# Patient Record
Sex: MALE | Race: WHITE | NOT HISPANIC OR LATINO | Employment: FULL TIME | URBAN - METROPOLITAN AREA
[De-identification: names, ages, dates, MRNs, and addresses within clinical notes are randomized per-mention and may not be internally consistent; named-entity substitution may affect disease eponyms.]

---

## 2018-06-08 ENCOUNTER — RECORDS - HEALTHEAST (OUTPATIENT)
Dept: LAB | Facility: CLINIC | Age: 58
End: 2018-06-08

## 2018-06-08 LAB
ANION GAP SERPL CALCULATED.3IONS-SCNC: 10 MMOL/L (ref 5–18)
BUN SERPL-MCNC: 15 MG/DL (ref 8–22)
CALCIUM SERPL-MCNC: 9.6 MG/DL (ref 8.5–10.5)
CHLORIDE BLD-SCNC: 103 MMOL/L (ref 98–107)
CO2 SERPL-SCNC: 25 MMOL/L (ref 22–31)
CREAT SERPL-MCNC: 0.99 MG/DL (ref 0.7–1.3)
GFR SERPL CREATININE-BSD FRML MDRD: >60 ML/MIN/1.73M2
GLUCOSE BLD-MCNC: 84 MG/DL (ref 70–125)
POTASSIUM BLD-SCNC: 4.1 MMOL/L (ref 3.5–5)
SODIUM SERPL-SCNC: 138 MMOL/L (ref 136–145)

## 2018-06-13 ENCOUNTER — RECORDS - HEALTHEAST (OUTPATIENT)
Dept: LAB | Facility: CLINIC | Age: 58
End: 2018-06-13

## 2018-06-13 LAB — IRON SERPL-MCNC: 70 UG/DL (ref 42–175)

## 2019-10-30 ENCOUNTER — RECORDS - HEALTHEAST (OUTPATIENT)
Dept: LAB | Facility: CLINIC | Age: 59
End: 2019-10-30

## 2019-10-30 LAB
ANION GAP SERPL CALCULATED.3IONS-SCNC: 7 MMOL/L (ref 5–18)
BUN SERPL-MCNC: 16 MG/DL (ref 8–22)
CALCIUM SERPL-MCNC: 9.5 MG/DL (ref 8.5–10.5)
CHLORIDE BLD-SCNC: 102 MMOL/L (ref 98–107)
CO2 SERPL-SCNC: 29 MMOL/L (ref 22–31)
CREAT SERPL-MCNC: 0.99 MG/DL (ref 0.7–1.3)
GFR SERPL CREATININE-BSD FRML MDRD: >60 ML/MIN/1.73M2
GLUCOSE BLD-MCNC: 95 MG/DL (ref 70–125)
POTASSIUM BLD-SCNC: 4.6 MMOL/L (ref 3.5–5)
SODIUM SERPL-SCNC: 138 MMOL/L (ref 136–145)

## 2020-10-23 ENCOUNTER — RECORDS - HEALTHEAST (OUTPATIENT)
Dept: LAB | Facility: CLINIC | Age: 60
End: 2020-10-23

## 2020-10-23 LAB
ANION GAP SERPL CALCULATED.3IONS-SCNC: 9 MMOL/L (ref 5–18)
BUN SERPL-MCNC: 17 MG/DL (ref 8–22)
CALCIUM SERPL-MCNC: 9.7 MG/DL (ref 8.5–10.5)
CHLORIDE BLD-SCNC: 103 MMOL/L (ref 98–107)
CHOLEST SERPL-MCNC: 193 MG/DL
CO2 SERPL-SCNC: 28 MMOL/L (ref 22–31)
CREAT SERPL-MCNC: 1.09 MG/DL (ref 0.7–1.3)
FASTING STATUS PATIENT QL REPORTED: NORMAL
GFR SERPL CREATININE-BSD FRML MDRD: >60 ML/MIN/1.73M2
GLUCOSE BLD-MCNC: 98 MG/DL (ref 70–125)
HDLC SERPL-MCNC: 46 MG/DL
LDLC SERPL CALC-MCNC: 128 MG/DL
POTASSIUM BLD-SCNC: 5.1 MMOL/L (ref 3.5–5)
SODIUM SERPL-SCNC: 140 MMOL/L (ref 136–145)
TRIGL SERPL-MCNC: 94 MG/DL

## 2021-06-17 ENCOUNTER — RECORDS - HEALTHEAST (OUTPATIENT)
Dept: LAB | Facility: CLINIC | Age: 61
End: 2021-06-17

## 2021-06-17 ENCOUNTER — TRANSFERRED RECORDS (OUTPATIENT)
Dept: HEALTH INFORMATION MANAGEMENT | Facility: CLINIC | Age: 61
End: 2021-06-17

## 2021-06-17 LAB
TSH SERPL DL<=0.005 MIU/L-ACNC: 1.53 UIU/ML (ref 0.3–5)
VIT B12 SERPL-MCNC: 668 PG/ML (ref 213–816)

## 2021-06-18 LAB — B BURGDOR IGG+IGM SER QL: 0.3 INDEX VALUE

## 2021-07-19 ENCOUNTER — TRANSCRIBE ORDERS (OUTPATIENT)
Dept: PHYSICAL MEDICINE AND REHAB | Facility: CLINIC | Age: 61
End: 2021-07-19

## 2021-07-19 DIAGNOSIS — R20.2 PARESTHESIA: Primary | ICD-10-CM

## 2021-07-27 NOTE — PROGRESS NOTES
ASSESSMENT: Jw Anton is a 61 year old male with past medical history significant for hypertension who presents today for new patient evaluation of a 6-week history of left lower extremity numbness and weakness and acute on chronic low back pain.  Numbness and weakness began 6 weeks ago without incident.  Chronic back pain worsened within the past few days.  My review of an MRI lumbar spine shows significant bilateral lateral recess stenosis at L4-5 related to a shallow disc protrusion where he could be trapping the L5 nerve roots.  Patient had an EMG left lower extremity 2 weeks after symptoms began which showed findings most consistent with subacute to chronic left L5 radiculopathy with no findings of peroneal nerve palsy at that time.  Patient reports that numbness and weakness has improved by about 20 to 30% since it began.  -On exam patient demonstrated weakness in the left ankle dorsiflexors and left EHL graded 4 out of 5.  He reported a sensory deficit left toes 2, 3, 4, and plantar/lateral aspect of left foot.    PLAN:  A shared decision making model was used.  The patient's values and choices were respected.  The following represents what was discussed and decided upon by the physician assistant and the patient.      1.  DIAGNOSTIC TESTS: I reviewed the MRI lumbar spine.  -I also reviewed the EMG left lower extremity from June 23.  -I ordered a repeat left EMG which will be more diagnostic now that he has symptoms greater than 1 month.    2.  PHYSICAL THERAPY:  Discussed the importance of core strengthening, ROM, stretching exercises with the patient and how each of these entities is important in decreasing pain.  Explained to the patient that the purpose of physical therapy is to teach the patient a home exercise program.  These exercises need to be performed every day in order to decrease pain and prevent future occurrences of pain.  I entered a referral to physical therapy.    3.  MEDICATIONS:    -Gabapentin 300 mg was prescribed.  He has been in the dosage titration chart.  He may increase his dose up to 300 mg 3 times daily.  -Patient can take ibuprofen as needed.    4.  INTERVENTIONS:    -I offered the patient left L5-S1 transforaminal epidural steroid injection targeting the left L5 nerve root.  Patient indicated he would like to proceed and an order was placed.  Second Covid vaccine was Julissa 3, 2021.  In my opinion the injection should not be delayed for a trial of conservative treatment.  He has significant weakness with EMG documented radiculopathy.    5.  PATIENT EDUCATION: Patient is in agreement the above plan.  All questions were answered.    6.  REFERRALS: I also entered a referral to neurosurgery in the case that symptoms fail to improve.    7.  FOLLOW-UP:   I would like to see the patient 1 week after his left L5-S1 transforaminal epidural steroid injection.  We will call the patient with the results of his EMG.  Will await recommendations from neurosurgery.  If he has questions or concerns, he should not hesitate to call.      SUBJECTIVE:  Jw Anton  Is a 61 year old male who presents today in consultation at the request of Dr. Bedoya for new patient evaluation left lower extremity numbness and weakness and low back pain.  Patient reports that symptoms began about 6 weeks ago.  He states that he went to a grad party and was sitting around a bonfire.  The next day he woke up with numb toes and weakness in the left ankle.  He does not recall compressing his knee against anything.  He was not sitting in an unusual position.  He did not lift anything heavy.  He did not have to drive far to get to the [place] [employer].  Patient states that the numbness and weakness has improved by 20 to 30% since it began but he is still quite limited in walking because of this weakness.  Over the past several days he has also had worsening of his chronic low back pain.  He states that he has had low back pain  for many years which he attributed to years of heavy lifting at work.  He has also experienced some pain extending into the right buttock and hip which he attributes to having to compensate for the left leg weakness with walking.    Patient complains of bilateral low back pain.  Pain spans across low back in a Broadbent distribution at the belt line.  He has some pain that radiates into the right buttock and hip.  He denies any significant pain down the left leg but he does feel a sensation of tightness in the left calf muscle.  He has numbness and tingling affecting left toes 2, 3, 4, the ball of the left foot and the heel of the left foot.  He states that his left ankle is still weak.  He states it feels like a club.  He denies any aggravating factors for the symptoms they are constant.  He denies any alleviating factors for the symptoms.  He tried ice.  He denies loss of bowel or bladder control.  Denies any recent fevers, chills, sweats.  Denies any numbness or weakness in the upper extremities.  Denies blurry or double vision.    Patient has not had any treatments for this.  He has not had physical therapy.  He does not go to a chiropractor.  He has not had any spine surgeries or spine injections.  Patient is using ibuprofen 800 mg 3 times daily.    Current Outpatient Medications   Medication Sig Dispense Refill     amLODIPine (NORVASC) 10 MG tablet Take 10 mg by mouth daily       aspirin 81 MG EC tablet Take 81 mg by mouth daily       fish oil-omega-3 fatty acids 1000 MG capsule Take 2 g by mouth daily       gabapentin (NEURONTIN) 300 MG capsule Take 1 capsule (300 mg) by mouth At Bedtime for 3 days, THEN 1 capsule (300 mg) 2 times daily for 3 days, THEN 1 capsule (300 mg) 3 times daily. 90 capsule 0     losartan (COZAAR) 25 MG tablet Take 25 mg by mouth daily       Multiple Vitamin (MULTIVITAMIN ADULT PO)            Allergies: None    Past medical history: Hypertension    Surgical history: None    Family  history: Sibling had Crohn's disease    Social history: Patient is .  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  He drinks several alcoholic beverages per week.  Denies tobacco use.  Denies illicit drug use.      ROS: Positive for joint pain, muscle pain, muscle fatigue, sciatica.  Specifically negative for bowel/bladder dysfunction, fevers,chills, appetite changes, unexplained weight loss.   Otherwise 13 systems reviewed are negative.  Please see the patient's intake questionnaire from today for details.      OBJECTIVE:  PHYSICAL EXAMINATION:    CONSTITUTIONAL:  Vital signs as above.  No acute distress.  The patient is well nourished and well groomed.  PSYCHIATRIC:  The patient is awake, alert, oriented to person, place, time and answering questions appropriately with clear speech.    HEENT: Normocephalic, atraumatic.  Sclera clear.  Neck is supple.  SKIN:  Skin over the face, bilateral lower extremities, and posterior torso is clean, dry, intact without rashes.    GAIT: Patient ambulates with a slight steppage gait on the left.  STANDING EXAMINATION: No significant tenderness to palpation bilateral lower lumbar paraspinous muscles.  Lumbar flexion mildly restricted.  Lumbar extension mildly restricted.  MUSCLE STRENGTH:  The patient has 4/5 strength left ankle dorsiflexors and left EHL.  5/5 strength for the bilateral hip flexors, knee flexors/extensors, right ankle dorsiflexors, bilateral ankle plantar flexors, right great toe extensors, bilateral ankle evertors/invertors.  NEUROLOGICAL:  2/4 patellar, and achilles reflexes bilaterally.  Negative Babinski's bilaterally.  No ankle clonus bilaterally.  Diminished sensation left toes 2, 3, 4, plantar aspect left foot, lateral aspect left foot.  VASCULAR:  2/4 dorsalis pedis pulses bilaterally.  Bilateral lower extremities are warm.  There is no pitting edema of the bilateral lower extremities.  ABDOMINAL:  Soft, non-distended, non-tender throughout  all quadrants.  No pulsatile mass palpated in the left lower quadrant.  LYMPH NODES:  No palpable or tender inguinal lymph nodes.  MUSCULOSKELETAL: Straight leg raise negative bilaterally.    RESULTS: I reviewed the MRI lumbar spine from [facility] dated July 2, 2021.  At L2-3 there is mild spinal stenosis.  At L3-4 there is moderate spinal stenosis with mild to moderate bilateral foraminal stenosis.  At L4-5 there is mild spinal canal stenosis and moderate bilateral lateral recess stenosis and mild bilateral foraminal stenosis.  At L5-S1 there is moderate facet arthropathy asymmetric on the right with mild right foraminal stenosis.  Please see report for further details.  Addencum 7/30/21 4:15 PM.  MRI was done at [facility].    I reviewed the report of the EMG from [facility] neurology dated June 23, 2021.  This shows electrical evidence suggestive of, but not diagnostic for, a subacute to chronic, left sided L5 radiculopathy with ongoing denervation of the right tibialis anterior muscle and right peroneus longus muscle.  Of note, given the relatively recent onset of this patient's symptoms it is possible that further electrical evidence will evolve with additional time to aid in more exact localization of these findings.  At this time findings are most suggestive of a left L5 radiculopathy.  Currently there is no further electrical evidence of nerve conduction studies to suggest left-sided common or deep peroneal neuropathy or left sciatic neuropathy.

## 2021-07-29 ENCOUNTER — OFFICE VISIT (OUTPATIENT)
Dept: PHYSICAL MEDICINE AND REHAB | Facility: CLINIC | Age: 61
End: 2021-07-29
Payer: COMMERCIAL

## 2021-07-29 VITALS — WEIGHT: 307.6 LBS | DIASTOLIC BLOOD PRESSURE: 78 MMHG | HEART RATE: 76 BPM | SYSTOLIC BLOOD PRESSURE: 166 MMHG

## 2021-07-29 DIAGNOSIS — M54.17 LUMBOSACRAL RADICULOPATHY AT L5: Primary | ICD-10-CM

## 2021-07-29 PROCEDURE — 99204 OFFICE O/P NEW MOD 45 MIN: CPT | Performed by: PHYSICIAN ASSISTANT

## 2021-07-29 RX ORDER — LOSARTAN POTASSIUM 25 MG/1
25 TABLET ORAL DAILY
COMMUNITY

## 2021-07-29 RX ORDER — ASPIRIN 81 MG/1
81 TABLET ORAL DAILY
COMMUNITY

## 2021-07-29 RX ORDER — CHLORAL HYDRATE 500 MG
2 CAPSULE ORAL DAILY
COMMUNITY

## 2021-07-29 RX ORDER — AMLODIPINE BESYLATE 10 MG/1
10 TABLET ORAL DAILY
COMMUNITY

## 2021-07-29 RX ORDER — GABAPENTIN 300 MG/1
CAPSULE ORAL
Qty: 90 CAPSULE | Refills: 0 | Status: SHIPPED | OUTPATIENT
Start: 2021-07-29 | End: 2021-08-24

## 2021-07-29 ASSESSMENT — PAIN SCALES - GENERAL: PAINLEVEL: MILD PAIN (3)

## 2021-07-29 NOTE — LETTER
7/29/2021         RE: Jw Anton  [address] [address]        Dear Colleague,    Thank you for referring your patient, Jw Anton, to the [facility] [place]. Please see a copy of my visit note below.          ASSESSMENT: Jw Anton is a 61 year old male with past medical history significant for hypertension who presents today for new patient evaluation of a 6-week history of left lower extremity numbness and weakness and acute on chronic low back pain.  Numbness and weakness began 6 weeks ago without incident.  Chronic back pain worsened within the past few days.  My review of an MRI lumbar spine shows significant bilateral lateral recess stenosis at L4-5 related to a shallow disc protrusion where he could be trapping the L5 nerve roots.  Patient had an EMG left lower extremity 2 weeks after symptoms began which showed findings most consistent with subacute to chronic left L5 radiculopathy with no findings of peroneal nerve palsy at that time.  Patient reports that numbness and weakness has improved by about 20 to 30% since it began.  -On exam patient demonstrated weakness in the left ankle dorsiflexors and left EHL graded 4 out of 5.  He reported a sensory deficit left toes 2, 3, 4, and plantar/lateral aspect of left foot.    PLAN:  A shared decision making model was used.  The patient's values and choices were respected.  The following represents what was discussed and decided upon by the physician assistant and the patient.      1.  DIAGNOSTIC TESTS: I reviewed the MRI lumbar spine.  -I also reviewed the EMG left lower extremity from June 23.  -I ordered a repeat left EMG which will be more diagnostic now that he has symptoms greater than 1 month.    2.  PHYSICAL THERAPY:  Discussed the importance of core strengthening, ROM, stretching exercises with the patient and how each of these entities is important in decreasing pain.  Explained to the patient  that the purpose of physical therapy is to teach the patient a home exercise program.  These exercises need to be performed every day in order to decrease pain and prevent future occurrences of pain.  I entered a referral to physical therapy.    3.  MEDICATIONS:   -Gabapentin 300 mg was prescribed.  He has been in the dosage titration chart.  He may increase his dose up to 300 mg 3 times daily.  -Patient can take ibuprofen as needed.    4.  INTERVENTIONS:    -I offered the patient left L5-S1 transforaminal epidural steroid injection targeting the left L5 nerve root.  Patient indicated he would like to proceed and an order was placed.  Second Covid vaccine was Julissa 3, 2021.  In my opinion the injection should not be delayed for a trial of conservative treatment.  He has significant weakness with EMG documented radiculopathy.    5.  PATIENT EDUCATION: Patient is in agreement the above plan.  All questions were answered.    6.  REFERRALS: I also entered a referral to neurosurgery in the case that symptoms fail to improve.    7.  FOLLOW-UP:   I would like to see the patient 1 week after his left L5-S1 transforaminal epidural steroid injection.  We will call the patient with the results of his EMG.  Will await recommendations from neurosurgery.  If he has questions or concerns, he should not hesitate to call.      SUBJECTIVE:  Jw Anton  Is a 61 year old male who presents today in consultation at the request of Dr. Bedoya for new patient evaluation left lower extremity numbness and weakness and low back pain.  Patient reports that symptoms began about 6 weeks ago.  He states that he went to a grad party and was sitting around a bonfire.  The next day he woke up with numb toes and weakness in the left ankle.  He does not recall compressing his knee against anything.  He was not sitting in an unusual position.  He did not lift anything heavy.  He did not have to drive far to get to the [employer].  Patient states  that the numbness and weakness has improved by 20 to 30% since it began but he is still quite limited in walking because of this weakness.  Over the past several days he has also had worsening of his chronic low back pain.  He states that he has had low back pain for many years which he attributed to years of heavy lifting at work.  He has also experienced some pain extending into the right buttock and hip which he attributes to having to compensate for the left leg weakness with walking.    Patient complains of bilateral low back pain.  Pain spans across low back in a Broadbent distribution at the belt line.  He has some pain that radiates into the right buttock and hip.  He denies any significant pain down the left leg but he does feel a sensation of tightness in the left calf muscle.  He has numbness and tingling affecting left toes 2, 3, 4, the ball of the left foot and the heel of the left foot.  He states that his left ankle is still weak.  He states it feels like a club.  He denies any aggravating factors for the symptoms they are constant.  He denies any alleviating factors for the symptoms.  He tried ice.  He denies loss of bowel or bladder control.  Denies any recent fevers, chills, sweats.  Denies any numbness or weakness in the upper extremities.  Denies blurry or double vision.    Patient has not had any treatments for this.  He has not had physical therapy.  He does not go to a chiropractor.  He has not had any spine surgeries or spine injections.  Patient is using ibuprofen 800 mg 3 times daily.    Current Outpatient Medications   Medication Sig Dispense Refill     amLODIPine (NORVASC) 10 MG tablet Take 10 mg by mouth daily       aspirin 81 MG EC tablet Take 81 mg by mouth daily       fish oil-omega-3 fatty acids 1000 MG capsule Take 2 g by mouth daily       gabapentin (NEURONTIN) 300 MG capsule Take 1 capsule (300 mg) by mouth At Bedtime for 3 days, THEN 1 capsule (300 mg) 2 times daily for 3 days,  THEN 1 capsule (300 mg) 3 times daily. 90 capsule 0     losartan (COZAAR) 25 MG tablet Take 25 mg by mouth daily       Multiple Vitamin (MULTIVITAMIN ADULT PO)            Allergies: None    Past medical history: Hypertension    Surgical history: None    Family history: Sibling had Crohn's disease    Social history: Patient is .  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  He drinks several alcoholic beverages per week.  Denies tobacco use.  Denies illicit drug use.      ROS: Positive for joint pain, muscle pain, muscle fatigue, sciatica.  Specifically negative for bowel/bladder dysfunction, fevers,chills, appetite changes, unexplained weight loss.   Otherwise 13 systems reviewed are negative.  Please see the patient's intake questionnaire from today for details.      OBJECTIVE:  PHYSICAL EXAMINATION:    CONSTITUTIONAL:  Vital signs as above.  No acute distress.  The patient is well nourished and well groomed.  PSYCHIATRIC:  The patient is awake, alert, oriented to person, place, time and answering questions appropriately with clear speech.    HEENT: Normocephalic, atraumatic.  Sclera clear.  Neck is supple.  SKIN:  Skin over the face, bilateral lower extremities, and posterior torso is clean, dry, intact without rashes.    GAIT: Patient ambulates with a slight steppage gait on the left.  STANDING EXAMINATION: No significant tenderness to palpation bilateral lower lumbar paraspinous muscles.  Lumbar flexion mildly restricted.  Lumbar extension mildly restricted.  MUSCLE STRENGTH:  The patient has 4/5 strength left ankle dorsiflexors and left EHL.  5/5 strength for the bilateral hip flexors, knee flexors/extensors, right ankle dorsiflexors, bilateral ankle plantar flexors, right great toe extensors, bilateral ankle evertors/invertors.  NEUROLOGICAL:  2/4 patellar, and achilles reflexes bilaterally.  Negative Babinski's bilaterally.  No ankle clonus bilaterally.  Diminished sensation left toes 2, 3, 4,  plantar aspect left foot, lateral aspect left foot.  VASCULAR:  2/4 dorsalis pedis pulses bilaterally.  Bilateral lower extremities are warm.  There is no pitting edema of the bilateral lower extremities.  ABDOMINAL:  Soft, non-distended, non-tender throughout all quadrants.  No pulsatile mass palpated in the left lower quadrant.  LYMPH NODES:  No palpable or tender inguinal lymph nodes.  MUSCULOSKELETAL: Straight leg raise negative bilaterally.    RESULTS: I reviewed the MRI lumbar spine from [place] dated July 2, 2021.  At L2-3 there is mild spinal stenosis.  At L3-4 there is moderate spinal stenosis with mild to moderate bilateral foraminal stenosis.  At L4-5 there is mild spinal canal stenosis and moderate bilateral lateral recess stenosis and mild bilateral foraminal stenosis.  At L5-S1 there is moderate facet arthropathy asymmetric on the right with mild right foraminal stenosis.  Please see report for further details.    I reviewed the report of the EMG from [facility] dated June 23, 2021.  This shows electrical evidence suggestive of, but not diagnostic for, a subacute to chronic, left sided L5 radiculopathy with ongoing denervation of the right tibialis anterior muscle and right peroneus longus muscle.  Of note, given the relatively recent onset of this patient's symptoms it is possible that further electrical evidence will evolve with additional time to aid in more exact localization of these findings.  At this time findings are most suggestive of a left L5 radiculopathy.  Currently there is no further electrical evidence of nerve conduction studies to suggest left-sided common or deep peroneal neuropathy or left sciatic neuropathy.      Again, thank you for allowing me to participate in the care of your patient.        Sincerely,        Kenna Mccord PA-C

## 2021-07-29 NOTE — PATIENT INSTRUCTIONS
Prescribed Gabapentin today, 300 mg tablets, to be titrated up to 3 tablets 3 times a day as tolerated for your nerve pain. Please follow Gabapentin dosing chart below.    Gabapentin 300mg Dosing Chart    DATE  MORNING AFTERNOON BEDTIME    Day 1 0 0 1    Day 2 0 0 1    Day 3 0 0 1    Day 4 1 0 1    Day 5 1 0 1    Day 6 1 0 1    Day 7 1 1 1    Day 8 1 1 1    Day 9 1 1 1     Continue medication, taking 3 capsules three times daily  Please call if you have any questions regarding how to take your medication          A referral was entered to see neurosurgery at the [facility].  They will call you to schedule an appointment.  Ifyou do not hear from them within 72 hrs, please call [phone] to schedule an appointment.      Please schedule this injection at least 1 week  from now to allow time for insurance prior authorization.  On the day of your injection, you cannot be sick or taking antibiotics.  If you become sick and are prescribed, please call the clinic so your injection can be rescheduled for once you have completed your antibiotics.  You will need to bring a  with you for your injection.   If you have any questions or concerns prior to your injection, please do not hesitate to call the nurse navigation line at [phone] or contact Kenna Mccord through [employer] ([employer]).

## 2021-07-30 ENCOUNTER — ANCILLARY PROCEDURE (OUTPATIENT)
Dept: PHYSICAL MEDICINE AND REHAB | Facility: CLINIC | Age: 61
End: 2021-07-30
Attending: PHYSICIAN ASSISTANT
Payer: COMMERCIAL

## 2021-07-30 VITALS
TEMPERATURE: 98 F | DIASTOLIC BLOOD PRESSURE: 78 MMHG | HEART RATE: 76 BPM | SYSTOLIC BLOOD PRESSURE: 130 MMHG | RESPIRATION RATE: 78 BRPM | OXYGEN SATURATION: 96 %

## 2021-07-30 DIAGNOSIS — M54.17 LUMBOSACRAL RADICULOPATHY AT L5: ICD-10-CM

## 2021-07-30 PROCEDURE — 64483 NJX AA&/STRD TFRM EPI L/S 1: CPT | Mod: LT | Performed by: PHYSICAL MEDICINE & REHABILITATION

## 2021-07-30 RX ORDER — METHYLPREDNISOLONE ACETATE 80 MG/ML
INJECTION, SUSPENSION INTRA-ARTICULAR; INTRALESIONAL; INTRAMUSCULAR; SOFT TISSUE
Status: COMPLETED | OUTPATIENT
Start: 2021-07-30 | End: 2021-07-30

## 2021-07-30 RX ORDER — LIDOCAINE HYDROCHLORIDE 10 MG/ML
INJECTION, SOLUTION EPIDURAL; INFILTRATION; INTRACAUDAL; PERINEURAL
Status: COMPLETED | OUTPATIENT
Start: 2021-07-30 | End: 2021-07-30

## 2021-07-30 RX ADMIN — METHYLPREDNISOLONE ACETATE 80 MG: 80 INJECTION, SUSPENSION INTRA-ARTICULAR; INTRALESIONAL; INTRAMUSCULAR; SOFT TISSUE at 15:17

## 2021-07-30 RX ADMIN — LIDOCAINE HYDROCHLORIDE 1 ML: 10 INJECTION, SOLUTION EPIDURAL; INFILTRATION; INTRACAUDAL; PERINEURAL at 15:17

## 2021-07-30 ASSESSMENT — PAIN SCALES - GENERAL
PAINLEVEL: MILD PAIN (2)
PAINLEVEL: MILD PAIN (2)

## 2021-07-30 NOTE — PATIENT INSTRUCTIONS
DISCHARGE INSTRUCTIONS    During office hours (8:00 a.m.- 4:00 p.m.) questions or concerns may be answered  by calling [facility] Navigation Nurses at  [phone].  Messages received after hours will be returned the following business day.      In the case of an emergency, please dial 911 or seek assistance at the nearest Emergency Room/Urgent Care facility.     All Patients:    ? You may experience an increase in your symptoms for the first 2 days (It may take anywhere between 2 days- 2 weeks for the steroid to have maximum effect).    ? You may use ice on the injection site, as frequently as 20 minutes each hour if needed.    ? You may take your pain medicine.    ? You may continue taking your regular medication after your injection. If you have had a Medial Branch Block you may resume pain medication once your pain diary is completed.    ? You may shower. No swimming, tub bath or hot tub for 48 hours.  You may remove your bandaid/bandage as soon as you are home.    ? You may resume light activities, as tolerated.    ? Resume your usual diet as tolerated.    ? It is strongly advised that you do not drive for 1-3 hours post injection.    ? If you have had oral sedation:  Do not drive for 8 hours post injection.      ? If you have had IV sedation:  Do not drive for 24 hours post injection.  Do not operate hazardous machinery or make important personal/business decisions for 24 hours.      POSSIBLE STEROID SIDE EFFECTS (If steroid/cortisone was used for your procedure)    -If you experience these symptoms, it should only last for a short period      Swelling of the legs                Skin redness (flushing)       Mouth (oral) irritation     Blood sugar (glucose) levels              Sweats                      Mood changes    Headache    Sleeplessness    Weakened immune system for up to 14 days, which could increase the risk of jero the COVID-19 virus and/or experiencing more severe symptoms of the  disease, if exposed.    Decreased effectiveness of the flu vaccine if given within 2 weeks of the steroid.         POSSIBLE PROCEDURE SIDE EFFECTS  -Call the [facility] Center if you are concerned    Increased Pain             Increased numbness/tingling        Nausea/Vomiting            Bruising/bleeding at site        Redness or swelling                                                Difficulty walking        Weakness             Fever greater than 100.5    *In the event of a severe headache after an epidural steroid injection that is relieved by lying down, please call the [facility] to speak with a clinical staff member*

## 2021-08-10 NOTE — PROGRESS NOTES
Assessment:   Jw Anton is a 61 year old y.o. male with past medical history significant for hypertension who presents today for follow-up regarding an 8-week history of left lower extremity numbness and weakness and acute on chronic low back pain.  My review of an MRI lumbar spine shows significant bilateral lateral recess stenosis at L4-5 related to a shallow disc protrusion where he could be trapping the L5 nerve roots. Patient had an EMG this morning which shows findings most consistent with an active left L5 radiculopathy. Patient status post a left L5-S1 transforaminal epidural steroid injection July 30, 2021 which provided 95% relief of his pain. He continues to have weakness in the left EHL and left ankle dorsiflexors, but it has improved compared with before the injection. He has a sensory deficit left L5 dermatome.  -Patient also complains of intermittent left lateral hip pain x5 days which I think is related to trochanteric bursitis.  -He also endorses intermittent right lower extremity weakness in which he feels like his right leg gives way at the hip, unclear etiology. Right leg strength testing was normal on my exam today.       Plan:     A shared decision making plan was used.  The patient's values and choices were respected.  The following represents what was discussed and decided upon by the physician assistant and the patient.      1.  DIAGNOSTIC TESTS: I reviewed the MRI lumbar spine.  I also reviewed the EMG studies left lower extremity.  No further diagnostic tests were ordered.    2.  PHYSICAL THERAPY: Patient is scheduled to begin physical therapy September 8, 2021.    3.  MEDICATIONS:    -Patient will continue gabapentin 300 mg three times daily. He tolerates this well.  -Patient can continue ibuprofen as needed.    4.  INTERVENTIONS: No additional interventions were ordered.    5.  PATIENT EDUCATION:    -I had referred the patient to neurosurgery when I saw him at his consultation.  He has not yet scheduled an appointment. He was given the phone number for neurosurgery. I will also ask the neurosurgery clinic to reach out to him to schedule a consultation. He is in a difficult situation right now. He does have ongoing weakness, although it is improved. I told the patient that it is reassuring that strength is improved today, but I am concerned it could worsen again or fail to improve further from here. Since the EMG does show active radiculopathy, I think it is most prudent for the patient to have an evaluation with neurosurgery.  -Patient is in agreement the above plan. All questions were answered.  -We will monitor left lateral hip pain and intermittent sensation of right leg weakness for now.    6.  FOLLOW-UP: Patient follow-up with me as needed. We will await recommendations from neurosurgery. If surgery is not recommended, I am happy to see the patient back to continue to work on nonsurgical treatments. We could potentially repeat his injection if needed. If he has questions or concerns in the meantime, he should not hesitate to call.    Subjective:     Jw Anton is a 61 year old male who presents today for follow-up regarding an 8-week history of left lower extremity numbness and weakness and low back pain.  Patient is following up after a left L5-S1 transforaminal epidural steroid injection July 30, 2021.  Patient reports 95% improvement in his symptoms following the injection.    Patient reports that he no longer has any low back pain. He does not have any pain radiating down the left leg. He does have a 5-day history of intermittent pain on the left lateral hip. He states that this happens with transitioning from seated to standing and certain twisting motions. When he sits he has no pain at all. He continues to have numbness and tingling in the left foot and toes but it is improved. He continues to have weakness in the left leg but he states it is improved. Patient states that  he has also had some intermittent right leg weakness. This has been going on since he first had the left leg symptoms 8 weeks ago. He states he feels the sensation of weakness in his hip when he is walking and he feels like his hip could give way. He denies any pain in the right low back or right leg. He denies any numbness or tingling down the right leg. He rates his pain today as a 3 out of 10. At its worst it is an 8 out of 10. At its best it is a 1 out of 10. Pain is aggravated with certain positions. Pain is alleviated with not moving.    Patient is scheduled to begin physical therapy September 8. He is taking gabapentin 300 g three times daily. He denies side effects but is not sure is helping. He uses Advil as needed which helps somewhat.    Review of Systems:  Positive for numbness/tingling, weakness. Negative for loss of bowel/bladder control, inability to urinate, headache, pain much worse at night, trip/stumble/falls, difficulty swallowing, difficulty with hand skills, fevers, unintentional weight loss.     Objective:   CONSTITUTIONAL:  Vital signs as above.  No acute distress.  The patient is well nourished and well groomed.    PSYCHIATRIC:  The patient is awake, alert, oriented to person, place and time.  The patient is answering questions appropriately with clear speech.  Normal affect.  HEENT: Normocephalic, atraumatic.  Sclera clear.    SKIN:  Skin over the face, posterior torso, bilateral upper and lower extremities is clean, dry, intact without rashes.  VASCULAR: No significant lower extremity edema.  MUSCULOSKELETAL:  Gait is non-antalgic. Patient is unable to heel walk on the left. Able to toe walk bilaterally.. No tenderness over the bilateral lower lumbar paraspinal muscles.    Mild tenderness palpation left greater trochanter. The patient has 5 -/5 strength left ankle dorsiflexors and 4/5 strength left EHL. 5/5 strength for the bilateral hip flexors, knee flexors/extensors, right ankle  dorsiflexors, bilateral ankle plantar flexors, right EHL.  NEUROLOGICAL: 2+ patellar, achilles reflexes which are symmetric bilaterally.  No ankle clonus bilaterally. Sensation diminished left L5 dermatome.     RESULTS:    I reviewed the MRI lumbar spine from [facility] dated July 2, 2021.  At L2-3 there is mild spinal stenosis.  At L3-4 there is moderate spinal stenosis with mild to moderate bilateral foraminal stenosis.  At L4-5 there is mild spinal canal stenosis and moderate bilateral lateral recess stenosis and mild bilateral foraminal stenosis.  At L5-S1 there is moderate facet arthropathy asymmetric on the right with mild right foraminal stenosis.  Please see report for further details.     I reviewed the report of the EMG from [facility] dated June 23, 2021.  This shows electrical evidence suggestive of, but not diagnostic for, a subacute to chronic, left sided L5 radiculopathy with ongoing denervation of the right tibialis anterior muscle and right peroneus longus muscle.  Of note, given the relatively recent onset of this patient's symptoms it is possible that further electrical evidence will evolve with additional time to aid in more exact localization of these findings.  At this time findings are most suggestive of a left L5 radiculopathy.  Currently there is no further electrical evidence of nerve conduction studies to suggest left-sided common or deep peroneal neuropathy or left sciatic neuropathy.    EMG from the spine center dated August 11, 2021:  Comment NCS: Abnormal study  1.  Absent left sural and superficial peroneal SNAPs.  Normal right sural SNAP.  2.  Normal left peroneal and tibial CMAPs.  3.  Mildly low amplitude left tibial H reflex versus right with symmetric distal latencies.     Comment EMG: Abnormal study  1.  Increased insertional activity left tibialis anterior peroneus longus tibialis posterior and L4-5 paraspinals with sustained denervation potentials in the peroneus  longus and tibialis posterior.  Remainder left lower extremity needle EMG normal     Interpretation: Abnormal study: There is electrodiagnostic evidence of:     1.  Increased insertional activity with denervation potentials on the left L5 myotome including lumbar paraspinals.  These findings are consistent with a left L5 radiculopathy, active.     2.  There is also electrodiagnostic evidence of absence of the left sural and superficial peroneal sensory studies and borderline left sural H reflex amplitude.  The left lower extremity SNAPs were reportedly present at EMG done elsewhere and right sural sensory study was presents today for comparison.  As the patient had abnormal insertional activity of the left lumbar paraspinals, his findings are most consistent with lumbar radiculopathy.  Given the abnormal sensory studies, I cannot completely exclude a concomitant sciatic neuropathy versus lumbosacral plexopathy.       3.  There is no electrodiagnostic evidence of left peroneal neuropathy at the fibular neck.

## 2021-08-11 ENCOUNTER — OFFICE VISIT (OUTPATIENT)
Dept: PHYSICAL MEDICINE AND REHAB | Facility: CLINIC | Age: 61
End: 2021-08-11
Payer: COMMERCIAL

## 2021-08-11 VITALS — SYSTOLIC BLOOD PRESSURE: 142 MMHG | OXYGEN SATURATION: 97 % | DIASTOLIC BLOOD PRESSURE: 78 MMHG | HEART RATE: 80 BPM

## 2021-08-11 DIAGNOSIS — M54.16 LUMBAR RADICULOPATHY: Primary | ICD-10-CM

## 2021-08-11 DIAGNOSIS — M54.17 LUMBOSACRAL RADICULOPATHY AT L5: ICD-10-CM

## 2021-08-11 DIAGNOSIS — M54.17 LUMBOSACRAL RADICULOPATHY AT L5: Primary | ICD-10-CM

## 2021-08-11 PROCEDURE — 99214 OFFICE O/P EST MOD 30 MIN: CPT | Performed by: PHYSICIAN ASSISTANT

## 2021-08-11 PROCEDURE — 95910 NRV CNDJ TEST 7-8 STUDIES: CPT | Performed by: PHYSICAL MEDICINE & REHABILITATION

## 2021-08-11 PROCEDURE — 95886 MUSC TEST DONE W/N TEST COMP: CPT | Mod: LT | Performed by: PHYSICAL MEDICINE & REHABILITATION

## 2021-08-11 ASSESSMENT — PAIN SCALES - GENERAL: PAINLEVEL: MILD PAIN (3)

## 2021-08-11 NOTE — LETTER
8/11/2021         RE: Jw Anton  [address] [facility] [address]        Dear Colleague,    Thank you for referring your patient, Jw Anton, to the [facility] [place]. Please see a copy of my visit note below.    Assessment:   Jw Anton is a 61 year old y.o. male with past medical history significant for hypertension who presents today for follow-up regarding an 8-week history of left lower extremity numbness and weakness and acute on chronic low back pain.  My review of an MRI lumbar spine shows significant bilateral lateral recess stenosis at L4-5 related to a shallow disc protrusion where he could be trapping the L5 nerve roots. Patient had an EMG this morning which shows findings most consistent with an active left L5 radiculopathy. Patient status post a left L5-S1 transforaminal epidural steroid injection July 30, 2021 which provided 95% relief of his pain. He continues to have weakness in the left EHL and left ankle dorsiflexors, but it has improved compared with before the injection. He has a sensory deficit left L5 dermatome.  -Patient also complains of intermittent left lateral hip pain x5 days which I think is related to trochanteric bursitis.  -He also endorses intermittent right lower extremity weakness in which he feels like his right leg gives way at the hip, unclear etiology. Right leg strength testing was normal on my exam today.       Plan:     A shared decision making plan was used.  The patient's values and choices were respected.  The following represents what was discussed and decided upon by the physician assistant and the patient.      1.  DIAGNOSTIC TESTS: I reviewed the MRI lumbar spine.  I also reviewed the EMG studies left lower extremity.  No further diagnostic tests were ordered.    2.  PHYSICAL THERAPY: Patient is scheduled to begin physical therapy September 8, 2021.    3.  MEDICATIONS:    -Patient will continue gabapentin 300 mg three  times daily. He tolerates this well.  -Patient can continue ibuprofen as needed.    4.  INTERVENTIONS: No additional interventions were ordered.    5.  PATIENT EDUCATION:    -I had referred the patient to neurosurgery when I saw him at his consultation. He has not yet scheduled an appointment. He was given the phone number for neurosurgery. I will also ask the neurosurgery clinic to reach out to him to schedule a consultation. He is in a difficult situation right now. He does have ongoing weakness, although it is improved. I told the patient that it is reassuring that strength is improved today, but I am concerned it could worsen again or fail to improve further from here. Since the EMG does show active radiculopathy, I think it is most prudent for the patient to have an evaluation with neurosurgery.  -Patient is in agreement the above plan. All questions were answered.  -We will monitor left lateral hip pain and intermittent sensation of right leg weakness for now.    6.  FOLLOW-UP: Patient follow-up with me as needed. We will await recommendations from neurosurgery. If surgery is not recommended, I am happy to see the patient back to continue to work on nonsurgical treatments. We could potentially repeat his injection if needed. If he has questions or concerns in the meantime, he should not hesitate to call.    Subjective:     Jw Anton is a 61 year old male who presents today for follow-up regarding an 8-week history of left lower extremity numbness and weakness and low back pain.  Patient is following up after a left L5-S1 transforaminal epidural steroid injection July 30, 2021.  Patient reports 95% improvement in his symptoms following the injection.    Patient reports that he no longer has any low back pain. He does not have any pain radiating down the left leg. He does have a 5-day history of intermittent pain on the left lateral hip. He states that this happens with transitioning from seated to  standing and certain twisting motions. When he sits he has no pain at all. He continues to have numbness and tingling in the left foot and toes but it is improved. He continues to have weakness in the left leg but he states it is improved. Patient states that he has also had some intermittent right leg weakness. This has been going on since he first had the left leg symptoms 8 weeks ago. He states he feels the sensation of weakness in his hip when he is walking and he feels like his hip could give way. He denies any pain in the right low back or right leg. He denies any numbness or tingling down the right leg. He rates his pain today as a 3 out of 10. At its worst it is an 8 out of 10. At its best it is a 1 out of 10. Pain is aggravated with certain positions. Pain is alleviated with not moving.    Patient is scheduled to begin physical therapy September 8. He is taking gabapentin 300 g three times daily. He denies side effects but is not sure is helping. He uses Advil as needed which helps somewhat.    Review of Systems:  Positive for numbness/tingling, weakness. Negative for loss of bowel/bladder control, inability to urinate, headache, pain much worse at night, trip/stumble/falls, difficulty swallowing, difficulty with hand skills, fevers, unintentional weight loss.     Objective:   CONSTITUTIONAL:  Vital signs as above.  No acute distress.  The patient is well nourished and well groomed.    PSYCHIATRIC:  The patient is awake, alert, oriented to person, place and time.  The patient is answering questions appropriately with clear speech.  Normal affect.  HEENT: Normocephalic, atraumatic.  Sclera clear.    SKIN:  Skin over the face, posterior torso, bilateral upper and lower extremities is clean, dry, intact without rashes.  VASCULAR: No significant lower extremity edema.  MUSCULOSKELETAL:  Gait is non-antalgic. Patient is unable to heel walk on the left. Able to toe walk bilaterally.. No tenderness over the  bilateral lower lumbar paraspinal muscles.    Mild tenderness palpation left greater trochanter. The patient has 5 -/5 strength left ankle dorsiflexors and 4/5 strength left EHL. 5/5 strength for the bilateral hip flexors, knee flexors/extensors, right ankle dorsiflexors, bilateral ankle plantar flexors, right EHL.  NEUROLOGICAL: 2+ patellar, achilles reflexes which are symmetric bilaterally.  No ankle clonus bilaterally. Sensation diminished left L5 dermatome.     RESULTS:    I reviewed the MRI lumbar spine from [facility] [facility] dated July 2, 2021.  At L2-3 there is mild spinal stenosis.  At L3-4 there is moderate spinal stenosis with mild to moderate bilateral foraminal stenosis.  At L4-5 there is mild spinal canal stenosis and moderate bilateral lateral recess stenosis and mild bilateral foraminal stenosis.  At L5-S1 there is moderate facet arthropathy asymmetric on the right with mild right foraminal stenosis.  Please see report for further details.     I reviewed the report of the EMG from [facility] neurolog[facility] dated June 23, 2021.  This shows electrical evidence suggestive of, but not diagnostic for, a subacute to chronic, left sided L5 radiculopathy with ongoing denervation of the right tibialis anterior muscle and right peroneus longus muscle.  Of note, given the relatively recent onset of this patient's symptoms it is possible that further electrical evidence will evolve with additional time to aid in more exact localization of these findings.  At this time findings are most suggestive of a left L5 radiculopathy.  Currently there is no further electrical evidence of nerve conduction studies to suggest left-sided common or deep peroneal neuropathy or left sciatic neuropathy.    EMG from the spine center dated August 11, 2021:  Comment NCS: Abnormal study  1.  Absent left sural and superficial peroneal SNAPs.  Normal right sural SNAP.  2.  Normal left peroneal and tibial CMAPs.  3.  Mildly low amplitude left  tibial H reflex versus right with symmetric distal latencies.     Comment EMG: Abnormal study  1.  Increased insertional activity left tibialis anterior peroneus longus tibialis posterior and L4-5 paraspinals with sustained denervation potentials in the peroneus longus and tibialis posterior.  Remainder left lower extremity needle EMG normal     Interpretation: Abnormal study: There is electrodiagnostic evidence of:     1.  Increased insertional activity with denervation potentials on the left L5 myotome including lumbar paraspinals.  These findings are consistent with a left L5 radiculopathy, active.     2.  There is also electrodiagnostic evidence of absence of the left sural and superficial peroneal sensory studies and borderline left sural H reflex amplitude.  The left lower extremity SNAPs were reportedly present at EMG done elsewhere and right sural sensory study was presents today for comparison.  As the patient had abnormal insertional activity of the left lumbar paraspinals, his findings are most consistent with lumbar radiculopathy.  Given the abnormal sensory studies, I cannot completely exclude a concomitant sciatic neuropathy versus lumbosacral plexopathy.       3.  There is no electrodiagnostic evidence of left peroneal neuropathy at the fibular neck.         Again, thank you for allowing me to participate in the care of your patient.        Sincerely,        Kenna Mccord PA-C

## 2021-08-11 NOTE — PROGRESS NOTES
Patient presents at the request of Kenna Mccodr for a left lower extremity EMG.  He has approximately 6-week history of left foot numbness and tingling digits 2 and 3 along with left leg weakness and low back pain.  No injury.  On exam he has normal sensation to light touch in the left lower extremity normal patellar and Achilles reflexes, and normal strength throughout the major muscle groups for me today.  Prior EMG shows evidence of chronic left lower extremity radiculopathy L5 distribution.  This was done by [facility].    EMG/NCS  results: Please see scanned full report.    Comment NCS: Abnormal study  1.  Absent left sural and superficial peroneal SNAPs.  Normal right sural SNAP.  2.  Normal left peroneal and tibial CMAPs.  3.  Mildly low amplitude left tibial H reflex versus right with symmetric distal latencies.    Comment EMG: Abnormal study  1.  Increased insertional activity left tibialis anterior peroneus longus tibialis posterior and L4-5 paraspinals with sustained denervation potentials in the peroneus longus and tibialis posterior.  Remainder left lower extremity needle EMG normal    Interpretation: Abnormal study: There is electrodiagnostic evidence of:    1.  Increased insertional activity with denervation potentials on the left L5 myotome including lumbar paraspinals.  These findings are consistent with a left L5 radiculopathy, active.    2.  There is also electrodiagnostic evidence of absence of the left sural and superficial peroneal sensory studies and borderline left sural H reflex amplitude.  The left lower extremity SNAPs were reportedly present at EMG done elsewhere and right sural sensory study was presents today for comparison.  As the patient had abnormal insertional activity of the left lumbar paraspinals, his findings are most consistent with lumbar radiculopathy.  Given the abnormal sensory studies, I cannot completely exclude a concomitant sciatic neuropathy versus lumbosacral  plexopathy.      3.  There is no electrodiagnostic evidence of left peroneal neuropathy at the fibular neck.    The testing was completed in its entirety by the physician.      It was our pleasure caring for your patient today, if there any questions or concerns please do not hesitate to contact us.

## 2021-08-11 NOTE — LETTER
8/11/2021         RE: Jw Anton  [address] [facility] [address]        Dear Colleague,    Thank you for referring your patient, Jw Anton, to the [facility] [place]. Please see a copy of my visit note below.    Patient presents at the request of Kenna Mccord for a left lower extremity EMG.  He has approximately 6-week history of left foot numbness and tingling digits 2 and 3 along with left leg weakness and low back pain.  No injury.  On exam he has normal sensation to light touch in the left lower extremity normal patellar and Achilles reflexes, and normal strength throughout the major muscle groups for me today.  Prior EMG shows evidence of chronic left lower extremity radiculopathy L5 distribution.  This was done by [facility].    EMG/NCS  results: Please see scanned full report.    Comment NCS: Abnormal study  1.  Absent left sural and superficial peroneal SNAPs.  Normal right sural SNAP.  2.  Normal left peroneal and tibial CMAPs.  3.  Mildly low amplitude left tibial H reflex versus right with symmetric distal latencies.    Comment EMG: Abnormal study  1.  Increased insertional activity left tibialis anterior peroneus longus tibialis posterior and L4-5 paraspinals with sustained denervation potentials in the peroneus longus and tibialis posterior.  Remainder left lower extremity needle EMG normal    Interpretation: Abnormal study: There is electrodiagnostic evidence of:    1.  Increased insertional activity with denervation potentials on the left L5 myotome including lumbar paraspinals.  These findings are consistent with a left L5 radiculopathy, active.    2.  There is also electrodiagnostic evidence of absence of the left sural and superficial peroneal sensory studies and borderline left sural H reflex amplitude.  The left lower extremity SNAPs were reportedly present at EMG done elsewhere and right sural sensory study was presents today for comparison.  As the  patient had abnormal insertional activity of the left lumbar paraspinals, his findings are most consistent with lumbar radiculopathy.  Given the abnormal sensory studies, I cannot completely exclude a concomitant sciatic neuropathy versus lumbosacral plexopathy.      3.  There is no electrodiagnostic evidence of left peroneal neuropathy at the fibular neck.    The testing was completed in its entirety by the physician.      It was our pleasure caring for your patient today, if there any questions or concerns please do not hesitate to contact us.            Again, thank you for allowing me to participate in the care of your patient.        Sincerely,        Olegario Arvizu, DO

## 2021-08-11 NOTE — PATIENT INSTRUCTIONS
A referral was entered to see [facility]surgery [facility]  They will call you to schedule an appointment.  Ifyou do not hear from them within 72 hrs, please call [phone] to schedule an appointment.

## 2021-08-12 DIAGNOSIS — M54.9 BACK PAIN: Primary | ICD-10-CM

## 2021-08-13 ENCOUNTER — OFFICE VISIT (OUTPATIENT)
Dept: NEUROSURGERY | Facility: CLINIC | Age: 61
End: 2021-08-13
Payer: COMMERCIAL

## 2021-08-13 ENCOUNTER — HOSPITAL ENCOUNTER (OUTPATIENT)
Dept: RADIOLOGY | Facility: HOSPITAL | Age: 61
Discharge: HOME | End: 2021-08-13
Attending: NEUROLOGICAL SURGERY | Admitting: NEUROLOGICAL SURGERY
Payer: COMMERCIAL

## 2021-08-13 VITALS
BODY MASS INDEX: 42.11 KG/M2 | DIASTOLIC BLOOD PRESSURE: 77 MMHG | OXYGEN SATURATION: 94 % | WEIGHT: 300.8 LBS | HEART RATE: 72 BPM | HEIGHT: 71 IN | SYSTOLIC BLOOD PRESSURE: 150 MMHG

## 2021-08-13 DIAGNOSIS — E66.01 MORBID OBESITY (H): ICD-10-CM

## 2021-08-13 DIAGNOSIS — M54.17 LUMBOSACRAL RADICULOPATHY AT L5: Primary | ICD-10-CM

## 2021-08-13 DIAGNOSIS — M51.369 LUMBAR DEGENERATIVE DISC DISEASE: ICD-10-CM

## 2021-08-13 DIAGNOSIS — M47.816 LUMBAR FACET ARTHROPATHY: ICD-10-CM

## 2021-08-13 DIAGNOSIS — M54.16 LUMBAR RADICULOPATHY: ICD-10-CM

## 2021-08-13 PROCEDURE — 72120 X-RAY BEND ONLY L-S SPINE: CPT

## 2021-08-13 PROCEDURE — 99205 OFFICE O/P NEW HI 60 MIN: CPT | Performed by: NEUROLOGICAL SURGERY

## 2021-08-13 ASSESSMENT — MIFFLIN-ST. JEOR: SCORE: 2191.55

## 2021-08-13 NOTE — PROGRESS NOTES
NEUROSURGERY CONSULTATION NOTE    8/13/2021       CHIEF COMPLAINT: Lumbar radiculopathy    HPI:    Jw Anton is a 61 year old male who is sent to us in consultation by Kenna Mccord for further evaluation of let lumbar radiculopathy.       Onset: 2 months  Character:  He notes a long standing history of low back pain but about 2 months ago he awoke with significantly worsened pain in the low back on the left. That same morning he had new numbness and tingling in the left foot around the second and third toes. He underwent TF ANGIE in late July which has since resolved his low back pain.    Numbness/tingling: It has improved since onset- isolated to the toes- not worsening or spreading.  Weakness: The morning he awoke with new back pain, he had new onset weakness in his left ankle- difficulty pulling his toes upward. Additionally, after walking about 3-4 blocks it will feel like the right leg wants to buckle from underneath him    Pain management: L5-S1 TF ANGIE 7/30/2021; continues on gabapentin 300mg TID; physical therapy has been ordered but is so backed up that he cannot get in until Sept 8, 2021    Past Medical History:   Diagnosis Date     Hypertension      Past Surgical History:   Procedure Laterality Date     NO PAST SURGERIES       REVIEW OF SYSTEMS:  Pt denies changes in bowel or bladder habits. No incontinence. A full 14 point review of systems was otherwise completed and is negative aside from that mentioned above in the HPI    MEDICATIONS:    Current Outpatient Medications   Medication Sig Dispense Refill     amLODIPine (NORVASC) 10 MG tablet Take 10 mg by mouth daily       aspirin 81 MG EC tablet Take 81 mg by mouth daily       fish oil-omega-3 fatty acids 1000 MG capsule Take 2 g by mouth daily       gabapentin (NEURONTIN) 300 MG capsule Take 1 capsule (300 mg) by mouth At Bedtime for 3 days, THEN 1 capsule (300 mg) 2 times daily for 3 days, THEN 1 capsule (300 mg) 3 times daily. 90 capsule 0      losartan (COZAAR) 25 MG tablet Take 25 mg by mouth daily       Multiple Vitamin (MULTIVITAMIN ADULT PO)            ALLERGIES/SENSITIVITIES:     No Known Allergies    PERTINENT SOCIAL HISTORY:   Social History     Socioeconomic History     Marital status: Single     Spouse name: None     Number of children: None     Years of education: None     Highest education level: None   Occupational History     None   Tobacco Use     Smoking status: Never Smoker     Smokeless tobacco: Never Used   Substance and Sexual Activity     Alcohol use: None     Drug use: None     Sexual activity: None   Other Topics Concern     Parent/sibling w/ CABG, MI or angioplasty before 65F 55M? Not Asked   Social History Narrative     None     Social Determinants of Health     Financial Resource Strain:      Difficulty of Paying Living Expenses:    Food Insecurity:      Worried About Running Out of Food in the Last Year:      Ran Out of Food in the Last Year:    Transportation Needs:      Lack of Transportation (Medical):      Lack of Transportation (Non-Medical):    Physical Activity:      Days of Exercise per Week:      Minutes of Exercise per Session:    Stress:      Feeling of Stress :    Social Connections:      Frequency of Communication with Friends and Family:      Frequency of Social Gatherings with Friends and Family:      Attends Spiritism Services:      Active Member of Clubs or Organizations:      Attends Club or Organization Meetings:      Marital Status:    Intimate Partner Violence:      Fear of Current or Ex-Partner:      Emotionally Abused:      Physically Abused:      Sexually Abused:          FAMILY HISTORY:  Family History   Problem Relation Age of Onset     No Known Problems Mother      No Known Problems Father      No Known Problems Maternal Grandmother      No Known Problems Maternal Grandfather      No Known Problems Paternal Grandmother      No Known Problems Paternal Grandfather      No Known Problems Brother      No  "Known Problems Sister      No Known Problems Son      No Known Problems Daughter      No Known Problems Maternal Half-Brother      No Known Problems Maternal Half-Sister      No Known Problems Paternal Half-Brother      No Known Problems Paternal Half-Sister      No Known Problems Niece      No Known Problems Nephew      No Known Problems Cousin         PHYSICAL EXAM:     Constitution: BP (!) 150/77   Pulse 72   Ht 5' 11\" (1.803 m)   Wt 300 lb 12.8 oz (136.4 kg)   SpO2 94%   BMI 41.95 kg/m  .   Awake, alert and in NAD  Eyes: Conjugate gaze. Conjunctiva benign without icterus or injection  Heart: RRR  Lungs: Non-labored respiration without accessory muscle use  Skin: No obvious rash or lesion  Psych: Appropriate mood and affect, alert and oriented x 3  Mental Status:  Speech is fluent.  Recent and remote memory are intact.  Attention span and concentration are normal.     Motor: Normal bulk and tone all muscle groups of upper and lower extremities.     Right Left  Right Left   Deltoid 5 5 Hip flexion 5 5   Biceps 5 5 Hip extension 5 5   Triceps 5 5 Knee flexion 5 5   Wrist ex 5 5 Knee ex 5 5   Wrist flex 5 5 Dorsiflex 5 4++   Finger ex 5 5 Plantar flex 5 5   Hand intrinsic 5 5 EHL 5 5    Full Full         Sensory: Sensation intact bilaterally to light touch and temperature throughout. Vibratory sense is intact in the right great toe, absent in the left great toe but intact at the left ankle     Coordination:  Gait is WNL. Pt is able to toe walk without difficulty. Pt is unable to bring himself to fully dorsiflex the left foot during isolated one leg stand, but he can on the right. Tandem gait is characterized by mild incoordination. RANDEE in the UE is WNL     Reflexes; 2+ supinator, biceps, triceps. 3+ patellar and achilles. No stern. 2 beats of clonus on the right, 1 on the left. Toes are down-going bilaterally    Musculoskeletal: Negative straight leg raise bilaterally. Negative EMANUEL testing.    IMAGING: " I personally reviewed all radiographic images    MRI lumbar spine 7/2/2021: Patient has evidence of multilevel lumbar degenerative disc disease, multilevel lumbar spondylosis.  L2-3, there appears to be possible autofusion through the 2 3 disc space.  At L3-4, there is prominent dorsal epidural fat, broad-based paracentral disc bulge, bilateral facet arthropathy with facet joint effusions all of which results in mild to moderate lateral recess stenosis and mild to moderate central canal stenosis.  At L4-5, patient has a broad-based paracentral disc bulge, bilateral facet arthropathy and ligamentum flavum hypertrophy resulting in mild to moderate lateral recess stenosis bilaterally.  Minimal degenerative changes noted at L5-S1, more prominent on the right than the left.    Lumbar spine flexion-extension x-rays 8/13/2021: Patient has evidence again of multilevel degenerative disc disease, there is mild retrolisthesis of L3 on L4 which appears unchanged between flexion and extension imaging.  No evidence of dynamic instability.      CONSULTATION ASSESSMENT AND PLAN:    Jw Anton is a 61 year old male who has evidence of multilevel lumbar spondylosis, lumbar degenerative disc disease, lumbar facet arthropathy who has signs and symptoms of left lumbar radiculopathy; he additionally has been experiencing right leg buckling with prolonged walking    I reviewed Jw's imaging with him today in clinic. He appears to be demonstrating signs of nerve healing with resolution of his back pain after his injection, gradual improvement in strength as well as sensation in the left foot. I offered him a left L4-5 hemilaminectomy, medial facetectomy with possible microscopic discectomy as well as a right L3-4 MIS decompressive laminectomy. We discussed the risks, benefits and alternatives to surgery. He expressed understanding and wishes to proceed with physical therapy in light of the fact that his symptoms are improving on  their own. He will contact us if he wishes to proceed with surgery    I spent more than 60 minutes in this apt, examining the pt, reviewing the scans, reviewing notes from chart, discussing treatment options with risks and benefits and coordinating care. >50 % clinic time was spent in face to face counseling and coordinating care    Farzaneh Griggs MD       Cc:   Perri Bedoya

## 2021-08-13 NOTE — LETTER
8/13/2021         RE: Jw Anton  [address] [facility] [address]        Dear Colleague,    Thank you for referring your patient, Jw Anton, to the [facility]. Please see a copy of my visit note below.    NEUROSURGERY CONSULTATION NOTE    8/13/2021       CHIEF COMPLAINT: Lumbar radiculopathy    HPI:    Jw Anton is a 61 year old male who is sent to us in consultation by Kenna Mccord for further evaluation of let lumbar radiculopathy.       Onset: 2 months  Character:  He notes a long standing history of low back pain but about 2 months ago he awoke with significantly worsened pain in the low back on the left. That same morning he had new numbness and tingling in the left foot around the second and third toes. He underwent TF ANGIE in late July which has since resolved his low back pain.    Numbness/tingling: It has improved since onset- isolated to the toes- not worsening or spreading.  Weakness: The morning he awoke with new back pain, he had new onset weakness in his left ankle- difficulty pulling his toes upward. Additionally, after walking about 3-4 blocks it will feel like the right leg wants to buckle from underneath him    Pain management: L5-S1 TF ANGIE 7/30/2021; continues on gabapentin 300mg TID; physical therapy has been ordered but is so backed up that he cannot get in until Sept 8, 2021    Past Medical History:   Diagnosis Date     Hypertension      Past Surgical History:   Procedure Laterality Date     NO PAST SURGERIES       REVIEW OF SYSTEMS:  Pt denies changes in bowel or bladder habits. No incontinence. A full 14 point review of systems was otherwise completed and is negative aside from that mentioned above in the HPI    MEDICATIONS:    Current Outpatient Medications   Medication Sig Dispense Refill     amLODIPine (NORVASC) 10 MG tablet Take 10 mg by mouth daily       aspirin 81 MG EC tablet Take 81 mg by mouth daily       fish oil-omega-3  fatty acids 1000 MG capsule Take 2 g by mouth daily       gabapentin (NEURONTIN) 300 MG capsule Take 1 capsule (300 mg) by mouth At Bedtime for 3 days, THEN 1 capsule (300 mg) 2 times daily for 3 days, THEN 1 capsule (300 mg) 3 times daily. 90 capsule 0     losartan (COZAAR) 25 MG tablet Take 25 mg by mouth daily       Multiple Vitamin (MULTIVITAMIN ADULT PO)            ALLERGIES/SENSITIVITIES:     No Known Allergies    PERTINENT SOCIAL HISTORY:   Social History     Socioeconomic History     Marital status: Single     Spouse name: None     Number of children: None     Years of education: None     Highest education level: None   Occupational History     None   Tobacco Use     Smoking status: Never Smoker     Smokeless tobacco: Never Used   Substance and Sexual Activity     Alcohol use: None     Drug use: None     Sexual activity: None   Other Topics Concern     Parent/sibling w/ CABG, MI or angioplasty before 65F 55M? Not Asked   Social History Narrative     None     Social Determinants of Health     Financial Resource Strain:      Difficulty of Paying Living Expenses:    Food Insecurity:      Worried About Running Out of Food in the Last Year:      Ran Out of Food in the Last Year:    Transportation Needs:      Lack of Transportation (Medical):      Lack of Transportation (Non-Medical):    Physical Activity:      Days of Exercise per Week:      Minutes of Exercise per Session:    Stress:      Feeling of Stress :    Social Connections:      Frequency of Communication with Friends and Family:      Frequency of Social Gatherings with Friends and Family:      Attends [employer] Services:      Active Member of Clubs or Organizations:      Attends Club or Organization Meetings:      Marital Status:    Intimate Partner Violence:      Fear of Current or Ex-Partner:      Emotionally Abused:      Physically Abused:      Sexually Abused:          FAMILY HISTORY:  Family History   Problem Relation Age of Onset     No Known  "Problems Mother      No Known Problems Father      No Known Problems Maternal Grandmother      No Known Problems Maternal Grandfather      No Known Problems Paternal Grandmother      No Known Problems Paternal Grandfather      No Known Problems Brother      No Known Problems Sister      No Known Problems Son      No Known Problems Daughter      No Known Problems Maternal Half-Brother      No Known Problems Maternal Half-Sister      No Known Problems Paternal Half-Brother      No Known Problems Paternal Half-Sister      No Known Problems Niece      No Known Problems Nephew      No Known Problems Cousin         PHYSICAL EXAM:     Constitution: BP (!) 150/77   Pulse 72   Ht 5' 11\" (1.803 m)   Wt 300 lb 12.8 oz (136.4 kg)   SpO2 94%   BMI 41.95 kg/m  .   Awake, alert and in NAD  Eyes: Conjugate gaze. Conjunctiva benign without icterus or injection  Heart: RRR  Lungs: Non-labored respiration without accessory muscle use  Skin: No obvious rash or lesion  Psych: Appropriate mood and affect, alert and oriented x 3  Mental Status:  Speech is fluent.  Recent and remote memory are intact.  Attention span and concentration are normal.     Motor: Normal bulk and tone all muscle groups of upper and lower extremities.     Right Left  Right Left   Deltoid 5 5 Hip flexion 5 5   Biceps 5 5 Hip extension 5 5   Triceps 5 5 Knee flexion 5 5   Wrist ex 5 5 Knee ex 5 5   Wrist flex 5 5 Dorsiflex 5 4++   Finger ex 5 5 Plantar flex 5 5   Hand intrinsic 5 5 EHL 5 5    Full Full         Sensory: Sensation intact bilaterally to light touch and temperature throughout. Vibratory sense is intact in the right great toe, absent in the left great toe but intact at the left ankle     Coordination:  Gait is WNL. Pt is able to toe walk without difficulty. Pt is unable to bring himself to fully dorsiflex the left foot during isolated one leg stand, but he can on the right. Tandem gait is characterized by mild incoordination. RANDEE in the UE is " WNL     Reflexes; 2+ supinator, biceps, triceps. 3+ patellar and achilles. No stern. 2 beats of clonus on the right, 1 on the left. Toes are down-going bilaterally    Musculoskeletal: Negative straight leg raise bilaterally. Negative EMANUEL testing.    IMAGING: I personally reviewed all radiographic images    MRI lumbar spine 7/2/2021: Patient has evidence of multilevel lumbar degenerative disc disease, multilevel lumbar spondylosis.  L2-3, there appears to be possible autofusion through the 2 3 disc space.  At L3-4, there is prominent dorsal epidural fat, broad-based paracentral disc bulge, bilateral facet arthropathy with facet joint effusions all of which results in mild to moderate lateral recess stenosis and mild to moderate central canal stenosis.  At L4-5, patient has a broad-based paracentral disc bulge, bilateral facet arthropathy and ligamentum flavum hypertrophy resulting in mild to moderate lateral recess stenosis bilaterally.  Minimal degenerative changes noted at L5-S1, more prominent on the right than the left.    Lumbar spine flexion-extension x-rays 8/13/2021: Patient has evidence again of multilevel degenerative disc disease, there is mild retrolisthesis of L3 on L4 which appears unchanged between flexion and extension imaging.  No evidence of dynamic instability.      CONSULTATION ASSESSMENT AND PLAN:    Jw Anton is a 61 year old male who has evidence of multilevel lumbar spondylosis, lumbar degenerative disc disease, lumbar facet arthropathy who has signs and symptoms of left lumbar radiculopathy; he additionally has been experiencing right leg buckling with prolonged walking    I reviewed Jw's imaging with him today in clinic. He appears to be demonstrating signs of nerve healing with resolution of his back pain after his injection, gradual improvement in strength as well as sensation in the left foot. I offered him a left L4-5 hemilaminectomy, medial facetectomy with possible  microscopic discectomy as well as a right L3-4 MIS decompressive laminectomy. We discussed the risks, benefits and alternatives to surgery. He expressed understanding and wishes to proceed with physical therapy in light of the fact that his symptoms are improving on their own. He will contact us if he wishes to proceed with surgery    I spent more than 60 minutes in this apt, examining the pt, reviewing the scans, reviewing notes from chart, discussing treatment options with risks and benefits and coordinating care. >50 % clinic time was spent in face to face counseling and coordinating care    Farzaneh Griggs MD       Cc:   Perri Bedoya, thank you for allowing me to participate in the care of your patient.        Sincerely,        Farzaneh Griggs MD

## 2021-08-13 NOTE — NURSING NOTE
Neurosurgery consultation was requested by: GO Gillespie  Pain: denies back pain   Radicular Pain is present: denies radicular pain   Lhermitte sign: no   Motor complaints: weakness in left leg   Sensory complaints: numbness in 2nd and 3rd toe on left foot   Gait and balance issues: yes   Bowel or bladder issues: no   Duration of SX is:2 months   The symptoms are worse with:  Constant   The symptoms are better with:nothing    Injury: no  Severity is: mild - moderate   Patient has tried the following conservative measures: he had a Left L5-S1 TFESI and helped pain go away but did not help numbness and weakness   TABATHA score is: NOT COMPLETED, PATIENT HAS NO PAIN.   HALLIE Ford

## 2021-08-21 DIAGNOSIS — M54.17 LUMBOSACRAL RADICULOPATHY AT L5: ICD-10-CM

## 2021-08-24 RX ORDER — GABAPENTIN 300 MG/1
300 CAPSULE ORAL 3 TIMES DAILY
Qty: 90 CAPSULE | Refills: 3 | Status: SHIPPED | OUTPATIENT
Start: 2021-08-24

## 2021-08-24 NOTE — TELEPHONE ENCOUNTER
Last appointment: 08/11/2021  Next appointment: None    Notes/Comments:      Rx request(s) has been reviewed.

## 2022-12-01 ENCOUNTER — LAB REQUISITION (OUTPATIENT)
Dept: LAB | Facility: CLINIC | Age: 62
End: 2022-12-01

## 2022-12-01 DIAGNOSIS — Z13.220 ENCOUNTER FOR SCREENING FOR LIPOID DISORDERS: ICD-10-CM

## 2022-12-01 DIAGNOSIS — I10 ESSENTIAL (PRIMARY) HYPERTENSION: ICD-10-CM

## 2022-12-01 DIAGNOSIS — Z12.5 ENCOUNTER FOR SCREENING FOR MALIGNANT NEOPLASM OF PROSTATE: ICD-10-CM

## 2022-12-01 LAB
ALBUMIN SERPL BCG-MCNC: 4.3 G/DL (ref 3.5–5.2)
ALP SERPL-CCNC: 122 U/L (ref 40–129)
ALT SERPL W P-5'-P-CCNC: 82 U/L (ref 10–50)
ANION GAP SERPL CALCULATED.3IONS-SCNC: 10 MMOL/L (ref 7–15)
AST SERPL W P-5'-P-CCNC: 50 U/L (ref 10–50)
BILIRUB SERPL-MCNC: 0.6 MG/DL
BUN SERPL-MCNC: 13.9 MG/DL (ref 8–23)
CALCIUM SERPL-MCNC: 9.5 MG/DL (ref 8.8–10.2)
CHLORIDE SERPL-SCNC: 101 MMOL/L (ref 98–107)
CHOLEST SERPL-MCNC: 202 MG/DL
CREAT SERPL-MCNC: 0.99 MG/DL (ref 0.67–1.17)
DEPRECATED HCO3 PLAS-SCNC: 28 MMOL/L (ref 22–29)
GFR SERPL CREATININE-BSD FRML MDRD: 86 ML/MIN/1.73M2
GLUCOSE SERPL-MCNC: 126 MG/DL (ref 70–99)
HDLC SERPL-MCNC: 47 MG/DL
LDLC SERPL CALC-MCNC: 135 MG/DL
NONHDLC SERPL-MCNC: 155 MG/DL
POTASSIUM SERPL-SCNC: 4.6 MMOL/L (ref 3.4–5.3)
PROT SERPL-MCNC: 7.4 G/DL (ref 6.4–8.3)
SODIUM SERPL-SCNC: 139 MMOL/L (ref 136–145)
TRIGL SERPL-MCNC: 98 MG/DL

## 2022-12-01 PROCEDURE — 80061 LIPID PANEL: CPT | Performed by: FAMILY MEDICINE

## 2022-12-01 PROCEDURE — 80053 COMPREHEN METABOLIC PANEL: CPT | Performed by: FAMILY MEDICINE

## 2022-12-01 PROCEDURE — G0103 PSA SCREENING: HCPCS | Performed by: FAMILY MEDICINE

## 2022-12-02 LAB — PSA SERPL-MCNC: 0.49 NG/ML (ref 0–4.5)

## 2024-02-13 ENCOUNTER — TRANSFERRED RECORDS (OUTPATIENT)
Dept: HEALTH INFORMATION MANAGEMENT | Facility: CLINIC | Age: 64
End: 2024-02-13
Payer: COMMERCIAL

## 2024-02-13 ENCOUNTER — LAB REQUISITION (OUTPATIENT)
Dept: LAB | Facility: CLINIC | Age: 64
End: 2024-02-13

## 2024-02-13 DIAGNOSIS — E78.00 PURE HYPERCHOLESTEROLEMIA, UNSPECIFIED: ICD-10-CM

## 2024-02-13 DIAGNOSIS — I10 ESSENTIAL (PRIMARY) HYPERTENSION: ICD-10-CM

## 2024-02-13 DIAGNOSIS — E11.65 TYPE 2 DIABETES MELLITUS WITH HYPERGLYCEMIA (H): ICD-10-CM

## 2024-02-13 DIAGNOSIS — Z12.5 ENCOUNTER FOR SCREENING FOR MALIGNANT NEOPLASM OF PROSTATE: ICD-10-CM

## 2024-02-13 LAB
ALBUMIN (URINE) MG/SPEC: 150 MG/L
ALBUMIN/CREATININE RATIO: >300 MG/G
CREATININE (URINE): 50 MG/DL (ref 10–300)
HBA1C MFR BLD: 15 % (ref 4.2–6.1)

## 2024-02-13 PROCEDURE — G0103 PSA SCREENING: HCPCS | Performed by: FAMILY MEDICINE

## 2024-02-13 PROCEDURE — 80061 LIPID PANEL: CPT | Performed by: FAMILY MEDICINE

## 2024-02-13 PROCEDURE — 80048 BASIC METABOLIC PNL TOTAL CA: CPT | Performed by: FAMILY MEDICINE

## 2024-02-13 PROCEDURE — 82570 ASSAY OF URINE CREATININE: CPT | Performed by: FAMILY MEDICINE

## 2024-02-14 ENCOUNTER — MEDICAL CORRESPONDENCE (OUTPATIENT)
Dept: HEALTH INFORMATION MANAGEMENT | Facility: CLINIC | Age: 64
End: 2024-02-14
Payer: COMMERCIAL

## 2024-02-14 LAB
ANION GAP SERPL CALCULATED.3IONS-SCNC: 14 MMOL/L (ref 7–15)
BUN SERPL-MCNC: 15.5 MG/DL (ref 8–23)
CALCIUM SERPL-MCNC: 9.2 MG/DL (ref 8.8–10.2)
CHLORIDE SERPL-SCNC: 95 MMOL/L (ref 98–107)
CHOLEST SERPL-MCNC: 239 MG/DL
CREAT SERPL-MCNC: 0.86 MG/DL (ref 0.67–1.17)
CREAT UR-MCNC: 80.8 MG/DL
DEPRECATED HCO3 PLAS-SCNC: 24 MMOL/L (ref 22–29)
EGFRCR SERPLBLD CKD-EPI 2021: >90 ML/MIN/1.73M2
FASTING STATUS PATIENT QL REPORTED: ABNORMAL
GLUCOSE SERPL-MCNC: 406 MG/DL (ref 70–99)
HDLC SERPL-MCNC: 41 MG/DL
LDLC SERPL CALC-MCNC: 156 MG/DL
MICROALBUMIN UR-MCNC: 720 MG/L
MICROALBUMIN/CREAT UR: 891.09 MG/G CR (ref 0–17)
NONHDLC SERPL-MCNC: 198 MG/DL
POTASSIUM SERPL-SCNC: 4.2 MMOL/L (ref 3.4–5.3)
PSA SERPL DL<=0.01 NG/ML-MCNC: 0.38 NG/ML (ref 0–4.5)
SODIUM SERPL-SCNC: 133 MMOL/L (ref 135–145)
TRIGL SERPL-MCNC: 211 MG/DL

## 2024-02-15 ENCOUNTER — TRANSCRIBE ORDERS (OUTPATIENT)
Dept: OTHER | Age: 64
End: 2024-02-15

## 2024-02-15 DIAGNOSIS — E11.65 TYPE 2 DIABETES MELLITUS WITH HYPERGLYCEMIA (H): Primary | ICD-10-CM

## 2024-03-07 ENCOUNTER — ALLIED HEALTH/NURSE VISIT (OUTPATIENT)
Dept: EDUCATION SERVICES | Facility: CLINIC | Age: 64
End: 2024-03-07
Attending: FAMILY MEDICINE
Payer: COMMERCIAL

## 2024-03-07 DIAGNOSIS — E11.65 TYPE 2 DIABETES MELLITUS WITH HYPERGLYCEMIA (H): ICD-10-CM

## 2024-03-07 PROCEDURE — G0108 DIAB MANAGE TRN  PER INDIV: HCPCS

## 2024-03-07 NOTE — PROGRESS NOTES
"Diabetes Self-Management Education & Support    Presents for:      Type of Service: In Person Visit      ASSESSMENT:    Patient seen today for DM education. Newly diagnosed with A1c at 15.0%. Pt notes he does not know of any family history nor does  he have any experience with DM but he has been researching online. We discussed new diagnosis BG and A1c goals. He was not sure if he wanted to check BG at home, discussed benefits and pt agrees. Pt was given Accu chek BG meter and was instructed on use. He was able to check BG w/o difficult. BG in clinic was 126 mg/dl about 1 hour after lunch.     Pt reports he has cut out sugar, soda (was drinking 1 liter per day) and fast foods. He has been looking  up cho foods online. We reviewed cho foods and portions as well as label reading and healthy eating.   He is eating 3 meals/day and some snacks.     Discussed activity and benefits. Pt reports he has started walking his dog every evening and was never doing this before. He notes it is hard sometimes as he works at a computer and [place] so his sleeping is also off.     He started Ozempic 0.25 mg/week, notes he has been taking for 3 weeks now. Denies any side effects, tolerating well.       Patient's most recent No results found for: \"A1C\", \"HEMOGLOBINA1\"  is not meeting goal of <7.0    Diabetes knowledge and skills assessment:   Patient is knowledgeable in diabetes management concepts related to: review in all     Continue education with the following diabetes management concepts: Healthy Eating, Being Active, Monitoring, Taking Medication, Problem Solving, Reducing Risks, and Healthy Coping    Based on learning assessment above, most appropriate setting for further diabetes education would be: Individual setting.      PLAN    Watch cho foods and portions. Goal 1-2/meal and 3-4/snack   Check BG a couple times per week rotating before and 2 hours pp   Continue to work on increasing exercise, walking dog daily " "    Follow-up: Pt notes he has follow up with PCP in 2 weeks and feels today was comprehensive so he would like to follow up with CDE PRN. Pt agrees to call w/ concerns or questions.     See Care Plan for co-developed, patient-state behavior change goals.  AVS provided for patient today.    Education Materials Provided:  [facility] Healthy Living with Diabetes Book      SUBJECTIVE/OBJECTIVE:     Cultural Influences/Ethnic Background:  Not  or     Diabetes Symptoms & Complications:          Patient Problem List and Family Medical History reviewed for relevant medical history, current medical status, and diabetes risk factors.    Vitals:  There were no vitals taken for this visit.  Estimated body mass index is 41.95 kg/m  as calculated from the following:    Height as of 8/13/21: 1.803 m (5' 11\").    Weight as of 8/13/21: 136.4 kg (300 lb 12.8 oz).   Last 3 BP:   BP Readings from Last 3 Encounters:   08/13/21 (!) 150/77   08/11/21 (!) 142/78   07/30/21 130/78       History   Smoking Status    Never   Smokeless Tobacco    Never       Labs:  No results found for: \"A1C\", \"HEMOGLOBINA1\"  Lab Results   Component Value Date     02/13/2024    GLC 98 10/23/2020     Lab Results   Component Value Date     02/13/2024     Direct Measure HDL   Date Value Ref Range Status   02/13/2024 41 >=40 mg/dL Final   ]  GFR Estimate   Date Value Ref Range Status   02/13/2024 >90 >60 mL/min/1.73m2 Final   10/23/2020 >60 >60 mL/min/1.73m2 Final     GFR Estimate If Black   Date Value Ref Range Status   10/23/2020 >60 >60 mL/min/1.73m2 Final     Lab Results   Component Value Date    CR 0.86 02/13/2024     No results found for: \"MICROALBUMIN\"    Healthy Eating:  Healthy Eating Assessed Today: Yes  Meal planning/habits: Avoiding sweets, Smaller portions  Meals include: Lunch, Dinner, Breakfast, Evening Snack  Beverages: Water, Milk    Being Active:  Being Active Assessed Today: Yes  Exercise:: Yes  Days per week " of moderate to strenuous exercise (like a brisk walk): 6  On average, minutes per day of exercise at this level: 60  How intense was your typical exercise? : Light (like stretching or slow walking)  Exercise Minutes per Week: 360    Monitoring:  Monitoring Assessed Today: Yes  Did patient bring glucose meter to appointment? : No  Blood Glucose Meter: Accu-chek  Times checking blood sugar at home (number): Never      Taking Medications:      Taking Medication Assessed Today: Yes  Current Treatments: Non-insulin Injectables  Given by: Patient  Problems taking diabetes medications regularly?: No  Diabetes medication side effects?: No    Problem Solving:  Problem Solving Assessed Today: Yes  Patient carries a carbohydrate source: No    Hypoglycemia Symptoms  Hypoglycemia: None    Hypoglycemia Complications  Hypoglycemia Complications: None    Reducing Risks:  Reducing Risks Assessed Today: Yes    Healthy Coping:  Healthy Coping Assessed Today: Yes  Emotional response to diabetes: Ready to learn  Stage of change: ACTION (Actively working towards change)  Patient Activation Measure Survey Score:       No data to display                  Care Plan and Education Provided:  Care Plan: Diabetes   Updates made by Analisa Betancur RN since 3/7/2024 12:00 AM        Problem: HbA1C Not In Goal         Goal: Establish Regular Follow-Ups with PCP         Task: Discuss with PCP the recommended timing for patient's next follow up visit(s)    Responsible User: Analisa Betancur RN        Task: Discuss schedule for PCP visits with patient Completed 3/7/2024   Responsible User: Analisa Betancur RN        Goal: Get HbA1C Level in Goal         Task: Educate patient on diabetes education self-management topics Completed 3/7/2024   Responsible User: Analisa Betancur RN        Task: Educate patient on benefits of regular glucose monitoring Completed 3/7/2024   Responsible User: Analisa Betancur RN        Task: Refer patient to appropriate extended  care team member, as needed (Medication Therapy Management, Behavioral Health, Physical Therapy, etc.)    Responsible User: Analisa Betancur RN        Task: Discuss diabetes treatment plan with patient Completed 3/7/2024   Responsible User: Analisa Betancur RN        Problem: Diabetes Self-Management Education Needed to Optimize Self-Care Behaviors         Goal: Understand diabetes pathophysiology and disease progression         Task: Provide education on diabetes pathophysiology and disease progression specfic to patient's diabetes type Completed 3/7/2024   Responsible User: Analisa Betancur RN        Goal: Healthy Eating - follow a healthy eating pattern for diabetes    Note:    Start watching cho foods and portions   Goal 1-2/snack and 3-4/meal        Task: Provide education on portion control and consistency in amount, composition and timing of food intake Completed 3/7/2024   Responsible User: Analisa Betancur RN        Task: Provide education on managing carbohydrate intake (carbohydrate counting, plate planning method, etc.) Completed 3/7/2024   Responsible User: Analisa Betancur RN        Task: Provide education on weight management    Responsible User: Analisa Betancur RN        Task: Provide education on heart healthy eating    Responsible User: Analisa Betancur RN        Task: Provide education on eating out    Responsible User: Analisa Betancur RN        Task: Develop individualized healthy eating plan with patient    Responsible User: Analisa Betancur RN        Goal: Being Active - get regular physical activity, working up to at least 150 minutes per week         Task: Provide education on relationship of activity to glucose and precautions to take if at risk for low glucose Completed 3/7/2024   Responsible User: Analisa Betancur RN        Task: Discuss barriers to physical activity with patient Completed 3/7/2024   Responsible User: Analisa Betancur RN        Task: Develop physical activity plan with patient     Responsible User: nAalisa Betancur RN        Task: Explore community resources including walking groups, assistance programs, and home videos    Responsible User: Analisa Betancur RN        Goal: Monitoring - monitor glucose and ketones as directed    Note:    Check BG a couple times per week rotating before and 2 hour pp        Task: Provide education on blood glucose monitoring (purpose, proper technique, frequency, glucose targets, interpreting results, when to use glucose control solution, sharps disposal) Completed 3/7/2024   Responsible User: Analisa Betancur RN        Task: Provide education on continuous glucose monitoring (sensor placement, use of wilfred or /reader, understanding glucose trends, alerts and alarms, differences between sensor glucose and blood glucose)    Responsible User: Analisa Betnacur RN        Task: Provide education on ketone monitoring (when to monitor, frequency, etc.)    Responsible User: Analisa Betancur RN        Goal: Taking Medication - patient is consistently taking medications as directed         Task: Provide education on action of prescribed medication, including when to take and possible side effects    Responsible User: Analisa Betancur RN        Task: Provide education on insulin and injectable diabetes medications, including administration, storage, site selection and rotation for injection sites Completed 3/7/2024   Responsible User: Analisa Betancur RN        Task: Discuss barriers to medication adherence with patient and provide management technique ideas as appropriate    Responsible User: Analisa Betancur RN        Task: Provide education on frequency and refill details of medications    Responsible User: Analisa Betancur RN        Goal: Problem Solving - know how to prevent and manage short-term diabetes complications         Task: Provide education on high blood glucose - causes, signs/symptoms, prevention and treatment Completed 3/7/2024   Responsible User: Pipe  BISMARK Hauser        Task: Provide education on low blood glucose - causes, signs/symptoms, prevention, treatment, carrying a carbohydrate source at all times, and medical identification Completed 3/7/2024   Responsible User: Analisa Betancur RN        Task: Provide education on safe travel with diabetes    Responsible User: Analisa Betancur RN        Task: Provide education on how to care for diabetes on sick days    Responsible User: Analisa Betancur RN        Task: Provide education on when to call a health care provider Completed 3/7/2024   Responsible User: Analisa Betancur RN        Goal: Reducing Risks - know how to prevent and treat long-term diabetes complications         Task: Provide education on major complications of diabetes, prevention, early diagnostic measures and treatment of complications Completed 3/7/2024   Responsible User: Analisa Betancur RN        Task: Provide education on recommended care for dental, eye and foot health    Responsible User: Analisa Betancur RN        Task: Provide education on Hemoglobin A1c - goals and relationship to blood glucose levels Completed 3/7/2024   Responsible User: Analisa Betancur RN        Task: Provide education on recommendations for heart health - lipid levels and goals, blood pressure and goals, and aspirin therapy, if indicated    Responsible User: Analisa Betancur RN        Task: Provide education on tobacco cessation    Responsible User: Analisa Betancur RN        Goal: Healthy Coping - use available resources to cope with the challenges of managing diabetes         Task: Discuss recognizing feelings about having diabetes Completed 3/7/2024   Responsible User: Analisa Betancur RN        Task: Provide education on the benefits of making appropriate lifestyle changes Completed 3/7/2024   Responsible User: Analisa Betancur RN        Task: Provide education on benefits of utilizing support systems    Responsible User: Analisa Betancur RN        Task: Discuss methods for  coping with stress    Responsible User: Analisa Betancur, RN        Task: Provide education on when to seek professional counseling    Responsible User: Analisa Betancur, RN            Time Spent: 60 minutes  Encounter Type: Individual    Any diabetes medication dose changes were made via the CDE Protocol per the patient's referring provider. A copy of this encounter was shared with the provider.

## 2024-03-26 ENCOUNTER — LAB REQUISITION (OUTPATIENT)
Dept: LAB | Facility: CLINIC | Age: 64
End: 2024-03-26

## 2024-03-26 DIAGNOSIS — E11.29 TYPE 2 DIABETES MELLITUS WITH OTHER DIABETIC KIDNEY COMPLICATION (H): ICD-10-CM

## 2024-03-26 DIAGNOSIS — E11.65 TYPE 2 DIABETES MELLITUS WITH HYPERGLYCEMIA (H): ICD-10-CM

## 2024-03-26 DIAGNOSIS — R80.9 PROTEINURIA, UNSPECIFIED: ICD-10-CM

## 2024-03-26 LAB — HBA1C MFR BLD: 10 %

## 2024-03-26 PROCEDURE — 80048 BASIC METABOLIC PNL TOTAL CA: CPT | Performed by: FAMILY MEDICINE

## 2024-03-26 PROCEDURE — 82570 ASSAY OF URINE CREATININE: CPT | Performed by: FAMILY MEDICINE

## 2024-03-26 PROCEDURE — 83036 HEMOGLOBIN GLYCOSYLATED A1C: CPT | Performed by: FAMILY MEDICINE

## 2024-03-27 LAB
ANION GAP SERPL CALCULATED.3IONS-SCNC: 13 MMOL/L (ref 7–15)
BUN SERPL-MCNC: 18.7 MG/DL (ref 8–23)
CALCIUM SERPL-MCNC: 9.5 MG/DL (ref 8.8–10.2)
CHLORIDE SERPL-SCNC: 100 MMOL/L (ref 98–107)
CREAT SERPL-MCNC: 0.9 MG/DL (ref 0.67–1.17)
CREAT UR-MCNC: 27 MG/DL
DEPRECATED HCO3 PLAS-SCNC: 23 MMOL/L (ref 22–29)
EGFRCR SERPLBLD CKD-EPI 2021: >90 ML/MIN/1.73M2
GLUCOSE SERPL-MCNC: 80 MG/DL (ref 70–99)
MICROALBUMIN UR-MCNC: 121 MG/L
MICROALBUMIN/CREAT UR: 448.15 MG/G CR (ref 0–17)
POTASSIUM SERPL-SCNC: 4.1 MMOL/L (ref 3.4–5.3)
SODIUM SERPL-SCNC: 136 MMOL/L (ref 135–145)

## 2024-05-12 ENCOUNTER — HEALTH MAINTENANCE LETTER (OUTPATIENT)
Age: 64
End: 2024-05-12

## 2024-07-21 ENCOUNTER — HEALTH MAINTENANCE LETTER (OUTPATIENT)
Age: 64
End: 2024-07-21

## 2024-12-08 ENCOUNTER — HEALTH MAINTENANCE LETTER (OUTPATIENT)
Age: 64
End: 2024-12-08

## 2025-03-03 ENCOUNTER — LAB REQUISITION (OUTPATIENT)
Dept: LAB | Facility: CLINIC | Age: 65
End: 2025-03-03

## 2025-03-03 DIAGNOSIS — Z12.5 ENCOUNTER FOR SCREENING FOR MALIGNANT NEOPLASM OF PROSTATE: ICD-10-CM

## 2025-03-03 DIAGNOSIS — I10 ESSENTIAL (PRIMARY) HYPERTENSION: ICD-10-CM

## 2025-03-03 LAB
ANION GAP SERPL CALCULATED.3IONS-SCNC: 14 MMOL/L (ref 7–15)
BUN SERPL-MCNC: 24 MG/DL (ref 8–23)
CALCIUM SERPL-MCNC: 9.5 MG/DL (ref 8.8–10.4)
CHLORIDE SERPL-SCNC: 102 MMOL/L (ref 98–107)
CREAT SERPL-MCNC: 0.98 MG/DL (ref 0.67–1.17)
EGFRCR SERPLBLD CKD-EPI 2021: 86 ML/MIN/1.73M2
GLUCOSE SERPL-MCNC: 103 MG/DL (ref 70–99)
HCO3 SERPL-SCNC: 23 MMOL/L (ref 22–29)
POTASSIUM SERPL-SCNC: 4.6 MMOL/L (ref 3.4–5.3)
PSA SERPL DL<=0.01 NG/ML-MCNC: 0.56 NG/ML (ref 0–4.5)
SODIUM SERPL-SCNC: 139 MMOL/L (ref 135–145)

## 2025-03-03 PROCEDURE — 80048 BASIC METABOLIC PNL TOTAL CA: CPT | Performed by: FAMILY MEDICINE

## 2025-03-03 PROCEDURE — G0103 PSA SCREENING: HCPCS | Performed by: FAMILY MEDICINE

## 2025-03-15 ENCOUNTER — HEALTH MAINTENANCE LETTER (OUTPATIENT)
Age: 65
End: 2025-03-15

## 2025-06-29 ENCOUNTER — HEALTH MAINTENANCE LETTER (OUTPATIENT)
Age: 65
End: 2025-06-29